# Patient Record
Sex: MALE | Race: WHITE | Employment: OTHER | URBAN - METROPOLITAN AREA
[De-identification: names, ages, dates, MRNs, and addresses within clinical notes are randomized per-mention and may not be internally consistent; named-entity substitution may affect disease eponyms.]

---

## 2017-08-21 ENCOUNTER — TELEPHONE (OUTPATIENT)
Dept: INFECTIOUS DISEASES | Age: 82
End: 2017-08-21

## 2017-08-25 ENCOUNTER — OFFICE VISIT (OUTPATIENT)
Dept: INFECTIOUS DISEASES | Age: 82
End: 2017-08-25

## 2017-08-25 VITALS
WEIGHT: 119 LBS | RESPIRATION RATE: 16 BRPM | DIASTOLIC BLOOD PRESSURE: 69 MMHG | BODY MASS INDEX: 16.12 KG/M2 | TEMPERATURE: 98.2 F | HEART RATE: 108 BPM | HEIGHT: 72 IN | SYSTOLIC BLOOD PRESSURE: 109 MMHG

## 2017-08-25 DIAGNOSIS — A04.72 CLOSTRIDIUM DIFFICILE COLITIS: Primary | ICD-10-CM

## 2017-08-25 PROCEDURE — 99203 OFFICE O/P NEW LOW 30 MIN: CPT | Performed by: INTERNAL MEDICINE

## 2017-08-25 ASSESSMENT — ENCOUNTER SYMPTOMS
COUGH: 0
SORE THROAT: 0
NAUSEA: 0
DIARRHEA: 1
TROUBLE SWALLOWING: 1
SHORTNESS OF BREATH: 0
VOMITING: 0
ABDOMINAL PAIN: 0

## 2017-10-31 ENCOUNTER — TELEPHONE (OUTPATIENT)
Dept: INFECTIOUS DISEASES | Age: 82
End: 2017-10-31

## 2017-10-31 NOTE — TELEPHONE ENCOUNTER
Call to [facility] in regards to Patient's multiple missed appts. Nurse Marcial states the Patient has Refused to go to his appts with ID. Recent Stool for C-dif was Negative. Nurse Ceja states a stool specimen collected on Oct 18/2017, and result was Negative for C-diff. Patient is being followed by his PCP. I thanked her for the update.